# Patient Record
Sex: MALE | Race: WHITE | NOT HISPANIC OR LATINO | Employment: FULL TIME | URBAN - METROPOLITAN AREA
[De-identification: names, ages, dates, MRNs, and addresses within clinical notes are randomized per-mention and may not be internally consistent; named-entity substitution may affect disease eponyms.]

---

## 2020-11-25 ENCOUNTER — COMMUNICATION - HEALTHEAST (OUTPATIENT)
Dept: SCHEDULING | Facility: CLINIC | Age: 30
End: 2020-11-25

## 2021-05-01 ENCOUNTER — HEALTH MAINTENANCE LETTER (OUTPATIENT)
Age: 31
End: 2021-05-01

## 2021-10-16 ENCOUNTER — HEALTH MAINTENANCE LETTER (OUTPATIENT)
Age: 31
End: 2021-10-16

## 2022-01-14 ENCOUNTER — OFFICE VISIT (OUTPATIENT)
Dept: FAMILY MEDICINE | Facility: CLINIC | Age: 32
End: 2022-01-14
Payer: COMMERCIAL

## 2022-01-14 VITALS
OXYGEN SATURATION: 100 % | TEMPERATURE: 98.7 F | DIASTOLIC BLOOD PRESSURE: 86 MMHG | RESPIRATION RATE: 18 BRPM | SYSTOLIC BLOOD PRESSURE: 135 MMHG | HEART RATE: 80 BPM

## 2022-01-14 DIAGNOSIS — R10.11 RUQ ABDOMINAL PAIN: Primary | ICD-10-CM

## 2022-01-14 PROCEDURE — 99213 OFFICE O/P EST LOW 20 MIN: CPT | Performed by: EMERGENCY MEDICINE

## 2022-01-14 RX ORDER — DEXTROAMPHETAMINE SACCHARATE, AMPHETAMINE ASPARTATE, DEXTROAMPHETAMINE SULFATE AND AMPHETAMINE SULFATE 5; 5; 5; 5 MG/1; MG/1; MG/1; MG/1
TABLET ORAL
COMMUNITY
Start: 2020-12-17

## 2022-01-14 RX ORDER — DEXTROAMPHETAMINE SACCHARATE, AMPHETAMINE ASPARTATE, DEXTROAMPHETAMINE SULFATE AND AMPHETAMINE SULFATE 7.5; 7.5; 7.5; 7.5 MG/1; MG/1; MG/1; MG/1
30 TABLET ORAL
COMMUNITY

## 2022-01-14 NOTE — PROGRESS NOTES
"Impression:  Right upper quadrant pain after fatty meals consistent with biliary colic    Plan:  We will schedule right upper quadrant ultrasound, he should follow-up with primary care after that.  Return or seek care if the pain is severe or unrelenting.  Or if he develops any fever or vomiting or other new symptoms.  I advised him to limit alcohol intake to 14 drinks per week.  Try to pursue a healthy diet.      Chief Complaint:  Patient presents with:  Abdominal Pain: HAD THIS A FEW MONTHS AGO-- DIET RELATED. CAME BACK AGAIN ABOUT A WEEK AGO. PAIN ON RT SIDE BELOW RIB CAGE-- HAVING \"DULL THROB\" AND TENDER TO TOUCH. NO VOMITING, DIARRHEA.          HPI:   Nitin Tucker is a 31 year old male who presents to this clinic for the evaluation of abdominal pain.  Patient has had intermittent right upper quadrant abdominal pain over the past several months.  He noticed it during the fall last year when he was eating more fatty foods and drinking more alcohol.  He decreased his consumption of fatty foods and alcohol and the pain improved.  However lately he has again been eating more fatty foods and drinking more alcohol and notes that the pain returns.  It tends to come on after a fatty meal.  Is located in the right upper quadrant and does not radiate anywhere.  It is dull and last for a few hours and then resolve spontaneously.  He drinks 4-5 beers several times per week.  No nausea or vomiting or diarrhea.  No fever or chills.  No cough chest pain or shortness of breath.  The pain is not pleuritic in nature      PMH:   No past medical history on file.  No past surgical history on file.      ROS:  All other systems negative    Meds:    Current Outpatient Medications:      amphetamine-dextroamphetamine (ADDERALL) 20 MG tablet, , Disp: , Rfl:      amphetamine-dextroamphetamine (ADDERALL) 30 MG tablet, Take 30 mg by mouth, Disp: , Rfl:         Social:  Social History     Socioeconomic History     Marital status: "      Spouse name: Not on file     Number of children: Not on file     Years of education: Not on file     Highest education level: Not on file   Occupational History     Not on file   Tobacco Use     Smoking status: Never Smoker     Smokeless tobacco: Never Used   Substance and Sexual Activity     Alcohol use: Not on file     Drug use: Not on file     Sexual activity: Not on file   Other Topics Concern     Not on file   Social History Narrative     Not on file     Social Determinants of Health     Financial Resource Strain: Not on file   Food Insecurity: Not on file   Transportation Needs: Not on file   Physical Activity: Not on file   Stress: Not on file   Social Connections: Not on file   Intimate Partner Violence: Not on file   Housing Stability: Not on file         Physical Exam:  Vitals:    01/14/22 1436   BP: 135/86   BP Location: Right arm   Patient Position: Sitting   Cuff Size: Adult Large   Pulse: 80   Resp: 18   Temp: 98.7  F (37.1  C)   TempSrc: Oral   SpO2: 100%      Patient is awake, alert, no distress  Abdomen: Nontender without mass  Skin: No lesions or rash  Neuro: Normal motor and sensory function in all extremities  Psych: Awake, alert, normally responsive      Results:    No results found for this or any previous visit (from the past 24 hour(s)).           Lg Hernandez MD

## 2022-01-14 NOTE — PATIENT INSTRUCTIONS
Patient Education     Possible Gallstone with Biliary Colic (Presumed)    Your abdominal pain is may be due to spasm of the gallbladder. This is called gallbladder or biliary colic. The gallbladder is a small sac under the liver, which stores and releases bile. Bile is a fluid that aids in the digestion of fat. Eating fatty food stimulates the gallbladder to contract, and release the bile. A gallstone may form in this sac. Although most people don't have symptoms, when the stone moves and blocks the passage of bile out of the bladder, it can cause pain and even an infection.  To be more certain of the diagnosis, you may need to have an ultrasound, CT-scan or other special test.  A number of things increase the risk for developing gallstones:    Being female    Obesity    Increasing age    Losing or gaining weight quickly    High calorie diet    Pregnancy    Hormone therapy    Diabetes  The most common symptoms are:    Abdominal pain, cramping, aching    Nausea, vomiting    Fever  Many illnesses can cause these symptoms. This pain usually starts in the upper right side of your abdomen. Sometimes it can radiate to your right shoulder, back and arm. It usually starts suddenly, becomes more intense quickly, and then gradually decreases and disappears over a couple of hours. Elderly people and diabetics may have trouble showing where the pain is exactly. The pain may occur after meals, especially a high fat meal.  Home care    Rest in bed and follow a clear liquid diet until feeling better. If pain or nausea medicine was given to help with your symptoms, take these as directed.    You can take acetaminophen or ibuprofen for pain, unless you were given a different pain medicine to use. Note: If you have chronic liver or kidney disease or ever had a stomach ulcer or gastrointestinal bleeding or are taking blood thinner medicines, talk with your healthcare provider before using these medicines.    Fat in your diet makes  the gallbladder contract and may cause increased pain. Therefore, limit fat in your diet over the next 2 days and follow a low-fat diet after that. If you are overweight, a low fat diet will help you lose weight.  Follow-up care  If a test was already scheduled for you, keep this appointment. Be sure you know how to prepare yourself for the test. Usually, you will be asked not to eat or drink anything for at least 8 hours before the test. Schedule an appointment with your own healthcare provider after your test is complete to discuss the findings. Biliary colic tends to recur and so treatment is usually needed. This treatment usually includes surgical removal of the gallbladder, called a cholecystectomy.  When to seek medical advice  Call your healthcare provider if any of the following occur:    Pain gets worse or moves to the right lower abdomen    Repeated vomiting    Swelling of the abdomen    Pain lasts over 6 hours    Fever of 100.4  F (38  C) or higher, or as directed by your healthcare provider    Weakness, dizziness    Dark urine or light colored stools    Yellow color of the skin or eyes    Chest, arm, back, neck or jaw pain  Call 911  Call 911 if any of these occur:    Trouble breathing    Very confused    Very drowsy or trouble awakening    Fainting or loss of consciousness    Rapid heart rate  [employer] last reviewed this educational content on 5/1/2018 2000-2021 [employer]. All rights reserved. This information is not intended as a substitute for professional medical care. Always follow your healthcare professional's instructions.

## 2022-05-22 ENCOUNTER — HEALTH MAINTENANCE LETTER (OUTPATIENT)
Age: 32
End: 2022-05-22

## 2022-09-25 ENCOUNTER — HEALTH MAINTENANCE LETTER (OUTPATIENT)
Age: 32
End: 2022-09-25

## 2023-06-04 ENCOUNTER — HEALTH MAINTENANCE LETTER (OUTPATIENT)
Age: 33
End: 2023-06-04

## 2024-07-20 ENCOUNTER — HEALTH MAINTENANCE LETTER (OUTPATIENT)
Age: 34
End: 2024-07-20